# Patient Record
Sex: MALE | Race: WHITE | URBAN - METROPOLITAN AREA
[De-identification: names, ages, dates, MRNs, and addresses within clinical notes are randomized per-mention and may not be internally consistent; named-entity substitution may affect disease eponyms.]

---

## 2019-07-15 ENCOUNTER — RECORDS - HEALTHEAST (OUTPATIENT)
Dept: LAB | Facility: HOSPITAL | Age: 34
End: 2019-07-15

## 2019-07-17 LAB
GAMMA INTERFERON BACKGROUND BLD IA-ACNC: 0.04 IU/ML
M TB IFN-G BLD-IMP: NEGATIVE
MITOGEN IGNF BCKGRD COR BLD-ACNC: 0.01 IU/ML
MITOGEN IGNF BCKGRD COR BLD-ACNC: 0.01 IU/ML
QTF INTERPRETATION: NORMAL
QTF MITOGEN - NIL: >10 IU/ML

## 2021-12-01 ENCOUNTER — HOSPITAL ENCOUNTER (EMERGENCY)
Facility: HOSPITAL | Age: 36
Discharge: LEFT BEFORE DISCHARGE | End: 2021-12-01
Payer: MEDICAID

## 2021-12-01 VITALS
RESPIRATION RATE: 18 BRPM | WEIGHT: 198 LBS | SYSTOLIC BLOOD PRESSURE: 152 MMHG | TEMPERATURE: 98 F | DIASTOLIC BLOOD PRESSURE: 100 MMHG | HEART RATE: 104 BPM | OXYGEN SATURATION: 94 %

## 2021-12-01 NOTE — ED PROVIDER NOTES
"    ED Provider In Triage Note  [facility]  Encounter Date: Dec 1, 2021    Chief Complaint   Patient presents with     Laceration       Brief HPI:   Freddy Patino is a 36 year old male presenting to the Emergency Department with a chief complaint of right hand laceration.Puched a mirror.    \"Don't want to go into it\" when asked why he punched a wall.      Admits to alcohol today.      No known injuries anywhere else.    Unknown last Td.        Brief Physical Exam:  There were no vitals taken for this visit.  General: Non-toxic appearing  HEENT: Atraumatic  Resp: No respiratory distress  Abdomen: Non-peritoneal   Neuro: Alert, oriented, answers questions appropriately  Psych: Doesn't want to talk  Musck: +laceration to right hand (~1-2 inches). +venous bleeding. Pressure dressing applied.        Plan Initiated in Triage:  No orders of the defined types were placed in this encounter.        PIT Dispo:   Place patient in the next available ED bed  Sitting with care giver      Yeimi Kendall MD on 12/1/2021 at 3:53 PM        Patient was evaluated by the Physician in Triage due to a limitation of available rooms in the Emergency Department. A plan of care was discussed based on the information obtained on the initial evaluation and patient was consuled to return back to the Emergency Department lobby after this initial evalutaiton until results were obtained or a room became available in the Emergency Department. Patient was counseled not to leave prior to receiving the results of their workup.            Yeimi Kendall MD  12/01/21 2726       Yeimi Kendall MD  12/01/21 5636    "

## 2021-12-01 NOTE — ED PROVIDER NOTES
Pt keeps walking around waiting room and dripping blood. Does not listen to sit and wait. Charge RN made aware that pt needs BED NOW since lac, bleeding, and psych concerns.     Yeimi Kendall MD  12/01/21 0451

## 2021-12-01 NOTE — ED TRIAGE NOTES
"Pt reports that he punched a mirror today because he \"was upset\" he had 4 vodka shots prior to ED arrival. He denies thoughts of harming self /others. Unable to control bleeding in triage.   "

## 2021-12-02 NOTE — ED NOTES
Pt was escorted from lobby by security.Pt was bleeding from hand dripping blood over lobby and entry floor. Pt stated he was going to be seen elsewhere.